# Patient Record
Sex: FEMALE | Race: WHITE | NOT HISPANIC OR LATINO | URBAN - METROPOLITAN AREA
[De-identification: names, ages, dates, MRNs, and addresses within clinical notes are randomized per-mention and may not be internally consistent; named-entity substitution may affect disease eponyms.]

---

## 2017-04-04 ENCOUNTER — OFFICE VISIT (OUTPATIENT)
Dept: INTERNAL MEDICINE | Facility: CLINIC | Age: 27
End: 2017-04-04
Payer: COMMERCIAL

## 2017-04-04 VITALS
SYSTOLIC BLOOD PRESSURE: 118 MMHG | HEART RATE: 106 BPM | TEMPERATURE: 97.5 F | WEIGHT: 141.5 LBS | HEIGHT: 66 IN | OXYGEN SATURATION: 99 % | BODY MASS INDEX: 22.74 KG/M2 | DIASTOLIC BLOOD PRESSURE: 78 MMHG

## 2017-04-04 DIAGNOSIS — M54.5 ACUTE RIGHT-SIDED LOW BACK PAIN, WITH SCIATICA PRESENCE UNSPECIFIED: ICD-10-CM

## 2017-04-04 DIAGNOSIS — R30.0 DYSURIA: ICD-10-CM

## 2017-04-04 DIAGNOSIS — N39.0 UPPER URINARY TRACT INFECTION: Primary | ICD-10-CM

## 2017-04-04 LAB
ALBUMIN SERPL-MCNC: 4 G/DL (ref 3.4–5)
ALBUMIN UR-MCNC: NEGATIVE MG/DL
ALP SERPL-CCNC: 84 U/L (ref 40–150)
ALT SERPL W P-5'-P-CCNC: 19 U/L (ref 0–50)
APPEARANCE UR: CLEAR
AST SERPL W P-5'-P-CCNC: 13 U/L (ref 0–45)
BACTERIA #/AREA URNS HPF: ABNORMAL /HPF
BETA HCG QUAL IFA URINE: NEGATIVE
BILIRUB DIRECT SERPL-MCNC: 0.1 MG/DL (ref 0–0.2)
BILIRUB SERPL-MCNC: 0.3 MG/DL (ref 0.2–1.3)
BILIRUB UR QL STRIP: NEGATIVE
COLOR UR AUTO: YELLOW
ERYTHROCYTE [DISTWIDTH] IN BLOOD BY AUTOMATED COUNT: 12.8 % (ref 10–15)
GLUCOSE UR STRIP-MCNC: NEGATIVE MG/DL
HCT VFR BLD AUTO: 43.8 % (ref 35–47)
HGB BLD-MCNC: 14.4 G/DL (ref 11.7–15.7)
HGB UR QL STRIP: ABNORMAL
KETONES UR STRIP-MCNC: NEGATIVE MG/DL
LEUKOCYTE ESTERASE UR QL STRIP: ABNORMAL
MCH RBC QN AUTO: 30.9 PG (ref 26.5–33)
MCHC RBC AUTO-ENTMCNC: 32.9 G/DL (ref 31.5–36.5)
MCV RBC AUTO: 94 FL (ref 78–100)
NITRATE UR QL: NEGATIVE
PH UR STRIP: 6 PH (ref 5–7)
PLATELET # BLD AUTO: 286 10E9/L (ref 150–450)
PROT SERPL-MCNC: 8.7 G/DL (ref 6.8–8.8)
RBC # BLD AUTO: 4.66 10E12/L (ref 3.8–5.2)
RBC #/AREA URNS AUTO: ABNORMAL /HPF (ref 0–2)
SP GR UR STRIP: <=1.005 (ref 1–1.03)
URN SPEC COLLECT METH UR: ABNORMAL
UROBILINOGEN UR STRIP-ACNC: 0.2 EU/DL (ref 0.2–1)
WBC # BLD AUTO: 10.2 10E9/L (ref 4–11)
WBC #/AREA URNS AUTO: ABNORMAL /HPF (ref 0–2)

## 2017-04-04 PROCEDURE — 87088 URINE BACTERIA CULTURE: CPT | Performed by: INTERNAL MEDICINE

## 2017-04-04 PROCEDURE — 87186 SC STD MICRODIL/AGAR DIL: CPT | Performed by: INTERNAL MEDICINE

## 2017-04-04 PROCEDURE — 81001 URINALYSIS AUTO W/SCOPE: CPT | Performed by: INTERNAL MEDICINE

## 2017-04-04 PROCEDURE — 36415 COLL VENOUS BLD VENIPUNCTURE: CPT | Performed by: INTERNAL MEDICINE

## 2017-04-04 PROCEDURE — 87086 URINE CULTURE/COLONY COUNT: CPT | Performed by: INTERNAL MEDICINE

## 2017-04-04 PROCEDURE — 99214 OFFICE O/P EST MOD 30 MIN: CPT | Performed by: INTERNAL MEDICINE

## 2017-04-04 PROCEDURE — 84703 CHORIONIC GONADOTROPIN ASSAY: CPT | Performed by: INTERNAL MEDICINE

## 2017-04-04 PROCEDURE — 85027 COMPLETE CBC AUTOMATED: CPT | Performed by: INTERNAL MEDICINE

## 2017-04-04 PROCEDURE — 80076 HEPATIC FUNCTION PANEL: CPT | Performed by: INTERNAL MEDICINE

## 2017-04-04 RX ORDER — CIPROFLOXACIN 500 MG/1
500 TABLET, FILM COATED ORAL 2 TIMES DAILY
Qty: 14 TABLET | Refills: 0 | Status: SHIPPED | OUTPATIENT
Start: 2017-04-04

## 2017-04-04 NOTE — NURSING NOTE
"Chief Complaint   Patient presents with     UTI       Initial /78  Pulse 106  Temp 97.5  F (36.4  C) (Oral)  Ht 5' 5.5\" (1.664 m)  Wt 141 lb 8 oz (64.2 kg)  LMP 03/30/2017  SpO2 99%  Breastfeeding? No  BMI 23.19 kg/m2 Estimated body mass index is 23.19 kg/(m^2) as calculated from the following:    Height as of this encounter: 5' 5.5\" (1.664 m).    Weight as of this encounter: 141 lb 8 oz (64.2 kg).  Medication Reconciliation: complete    "

## 2017-04-04 NOTE — PROGRESS NOTES
"    SUBJECTIVE:                                                    Katerine Kendrick is a 27 year old female who presents to clinic today for the following health issues:    URINARY TRACT SYMPTOMS     Onset: 4 days    Description:   Painful urination (Dysuria): no   Blood in urine (Hematuria): no   Delay in urine (Hesitency): no     Intensity: mild    Progression of Symptoms:  same    Accompanying Signs & Symptoms:  Fever/chills: YES  Flank pain YES  Nausea and vomiting: YES  Any vaginal symptoms: none  Abdominal/Pelvic Pain: YES   History:   History of frequent UTI's: no   History of kidney stones: no   Sexually Active: YES  Possibility of pregnancy: No    Precipitating factors:   none         Therapies Tried and outcome: AZO 2 weeks ago    Self rx for dysuria. Took OTC Azo for this.    LBP after this along with some nightly chills, nausea but no nazanin fevers.   Denies risk for STDs  Denies missing any of her BCPs  Denies any abd pain, diarrhea, constipation    Problem list and histories reviewed & adjusted, as indicated.  Additional history: as documented    Labs reviewed in EPIC    Reviewed and updated as needed this visit by clinical staff  Allergies       Reviewed and updated as needed this visit by Provider         ROS:  Constitutional, HEENT, cardiovascular, pulmonary, gi and gu systems are negative, except as otherwise noted.    OBJECTIVE:                                                    /78  Pulse 106  Temp 97.5  F (36.4  C) (Oral)  Ht 5' 5.5\" (1.664 m)  Wt 141 lb 8 oz (64.2 kg)  LMP 03/30/2017  SpO2 99%  Breastfeeding? No  BMI 23.19 kg/m2  Body mass index is 23.19 kg/(m^2).  GENERAL APPEARANCE: alert, no distress, fatigued and warm sweaty  HENT: nose and mouth without ulcers or lesions  NECK: no adenopathy, no asymmetry, masses, or scars and thyroid normal to palpation  RESP: lungs clear to auscultation - no rales, rhonchi or wheezes  CV: regular rates and rhythm, normal S1 S2, no S3 or S4 " and no murmur, click or rub  Abd: NTND  Back: R CVA tenderness   MS: extremities normal- no gross deformities noted  SKIN: no suspicious lesions or rashes    Diagnostic test results:  Results for orders placed or performed in visit on 04/04/17 (from the past 24 hour(s))   UA with Microscopic reflex to Culture   Result Value Ref Range    Color Urine Yellow     Appearance Urine Clear     Glucose Urine Negative NEG mg/dL    Bilirubin Urine Negative NEG    Ketones Urine Negative NEG mg/dL    Specific Gravity Urine <=1.005 1.003 - 1.035    pH Urine 6.0 5.0 - 7.0 pH    Protein Albumin Urine Negative NEG mg/dL    Urobilinogen Urine 0.2 0.2 - 1.0 EU/dL    Nitrite Urine Negative NEG    Blood Urine Trace (A) NEG    Leukocyte Esterase Urine Trace (A) NEG    Source Midstream Urine     WBC Urine 2-5 (A) 0 - 2 /HPF    RBC Urine O - 2 0 - 2 /HPF    Bacteria Urine Few (A) NEG /HPF   Beta HCG qual IFA urine   Result Value Ref Range    Beta HCG Qual IFA Urine Negative NEG   CBC with platelets   Result Value Ref Range    WBC 10.2 4.0 - 11.0 10e9/L    RBC Count 4.66 3.8 - 5.2 10e12/L    Hemoglobin 14.4 11.7 - 15.7 g/dL    Hematocrit 43.8 35.0 - 47.0 %    MCV 94 78 - 100 fl    MCH 30.9 26.5 - 33.0 pg    MCHC 32.9 31.5 - 36.5 g/dL    RDW 12.8 10.0 - 15.0 %    Platelet Count 286 150 - 450 10e9/L        ASSESSMENT/PLAN:                                                    1. Dysuria  2. Acute right-sided low back pain, with sciatica presence unspecified  3. Upper urinary tract infection  Her sx are not classic for pyelo - but with the recent classic lower tract sx and no rx- there is risk. No evidence of anything else on exam or labs. Will get urine cx. If no growth after 48 hrs then d/c antibx.   - ciprofloxacin (CIPRO) 500 MG tablet; Take 1 tablet (500 mg) by mouth 2 times daily  Dispense: 14 tablet; Refill: 0    Joseph Singleton MD  [facility]

## 2017-04-04 NOTE — MR AVS SNAPSHOT
"              After Visit Summary   2017    Katerine Kendrick    MRN: [id number]           Patient Information     Date Of Birth          1990        Visit Information        Provider Department      2017 11:00 AM Joseph Singleton MD [facility]        Today's Diagnoses     Upper urinary tract infection    -  1    Dysuria        Acute right-sided low back pain, with sciatica presence unspecified           Follow-ups after your visit        Who to contact     If you have questions or need follow up information about today's clinic visit or your schedule please contact [facility] directly at [phone].  Normal or non-critical lab and imaging results will be communicated to you by MyChart, letter or phone within 4 business days after the clinic has received the results. If you do not hear from us within 7 days, please contact the clinic through MyChart or phone. If you have a critical or abnormal lab result, we will notify you by phone as soon as possible.  Submit refill requests through [employer] or call your pharmacy and they will forward the refill request to us. Please allow 3 business days for your refill to be completed.          Additional Information About Your Visit        MyChart Information     [employer] lets you send messages to your doctor, view your test results, renew your prescriptions, schedule appointments and more. To sign up, go to [email or web address] . Click on \"Log in\" on the left side of the screen, which will take you to the Welcome page. Then click on \"Sign up Now\" on the right side of the page.     You will be asked to enter the access code listed below, as well as some personal information. Please follow the directions to create your username and password.     Your access code is: [email or web address]  Expires: 7/3/2017 12:25 PM     Your access code will  in 90 days. If you need help or a new code, please call your [place] " "clinic or [phone].        Care EveryWhere ID     This is your Care EveryWhere ID. This could be used by other organizations to access your [facility] medical records  [id number]        Your Vitals Were     Pulse Temperature Height Last Period Pulse Oximetry Breastfeeding?    106 97.5  F (36.4  C) (Oral) 5' 5.5\" (1.664 m) 03/30/2017 99% No    BMI (Body Mass Index)                   23.19 kg/m2            Blood Pressure from Last 3 Encounters:   04/04/17 118/78   12/16/16 115/68   11/25/15 114/81    Weight from Last 3 Encounters:   04/04/17 141 lb 8 oz (64.2 kg)   12/16/16 139 lb (63 kg)   11/25/15 138 lb (62.6 kg)              We Performed the Following     Beta HCG qual IFA urine     CBC with platelets     Hepatic panel (Albumin, ALT, AST, Bili, Alk Phos, TP)     UA with Microscopic reflex to Culture          Today's Medication Changes          These changes are accurate as of: 4/4/17 12:25 PM.  If you have any questions, ask your nurse or doctor.               Start taking these medicines.        Dose/Directions    ciprofloxacin 500 MG tablet   Commonly known as:  CIPRO   Used for:  Upper urinary tract infection   Started by:  Joseph Singleton MD        Dose:  500 mg   Take 1 tablet (500 mg) by mouth 2 times daily   Quantity:  14 tablet   Refills:  0            Where to get your medicines      These medications were sent to [place] [address] [place] [address]   [address] [facility] [address]     Phone:  [phone]     ciprofloxacin 500 MG tablet                Primary Care Provider Office Phone # Fax #    Dai GARCIA Gongora [phone]       [facility] [address] [address]        Thank you!     Thank you for choosing [facility]  for your care. Our goal is always to provide you with excellent care. Hearing back from our patients is one way we can continue to improve our services. Please take a few " minutes to complete the written survey that you may receive in the mail after your visit with us. Thank you!             Your Updated Medication List - Protect others around you: Learn how to safely use, store and throw away your medicines at www.disposemymeds.org.          This list is accurate as of: 4/4/17 12:25 PM.  Always use your most recent med list.                   Brand Name Dispense Instructions for use    ciprofloxacin 500 MG tablet    CIPRO    14 tablet    Take 1 tablet (500 mg) by mouth 2 times daily       ibuprofen 200 MG capsule      two tabs prn for pain and fever       levonorgestrel-ethinyl estradiol 0.1-20 MG-MCG per tablet    AVIANE,ALESSE,LESSINA    84 tablet    Take 1 tablet by mouth daily

## 2017-04-06 LAB
BACTERIA SPEC CULT: ABNORMAL
MICRO REPORT STATUS: ABNORMAL
MICROORGANISM SPEC CULT: ABNORMAL
SPECIMEN SOURCE: ABNORMAL

## 2017-11-08 ENCOUNTER — TELEPHONE (OUTPATIENT)
Dept: FAMILY MEDICINE | Facility: CLINIC | Age: 27
End: 2017-11-08

## 2017-11-08 NOTE — TELEPHONE ENCOUNTER
Panel Management Review          Composite cancer screening  Chart review shows that this patient is due/due soon for the following Pap Smear  Summary:    Patient is due/failing the following:   PAP    Action needed:   Patient needs office visit for physical and pap.    Type of outreach:    Sent letter.    Questions for provider review:    None                                                                                                                                    LAWRENCE LINDSAY        .

## 2017-11-08 NOTE — LETTER
[facility]  [address] [address]  [phone]    November 8, 2017    Katerine Kendrick  [address] [address] [address]          Dear Katerine,    --Pap smear screening is recommended every 3 years. Some patients require more frequent Pap smears based on an individual assessment of other risk factors. Please call the clinic to schedule this in the near future.     If you have had this test  at an outside facility, please let us know so that we can update your record.     Please disregard this notice if you have already scheduled an appointment.     Sincerely,    Dai LOPEZ

## 2017-12-23 ENCOUNTER — HEALTH MAINTENANCE LETTER (OUTPATIENT)
Age: 27
End: 2017-12-23

## 2018-03-04 DIAGNOSIS — Z30.41 ENCOUNTER FOR SURVEILLANCE OF CONTRACEPTIVE PILLS: ICD-10-CM

## 2018-03-05 RX ORDER — LEVONORGESTREL/ETHIN.ESTRADIOL 0.1-0.02MG
1 TABLET ORAL DAILY
Qty: 30 TABLET | Refills: 0 | Status: SHIPPED | OUTPATIENT
Start: 2018-03-05

## 2018-03-05 NOTE — TELEPHONE ENCOUNTER
"Requested Prescriptions   Pending Prescriptions Disp Refills     ORSYTHIA 0.1-20 MG-MCG per tablet [Pharmacy Med Name: ORSYTHIA-28 TABLET]  Last Written Prescription Date:  12/16/2016  Last Fill Quantity: 84,  # refills: 4   Last office visit: 12/16/2016 with prescribing provider:  Jaret   Future Office Visit:     84 tablet 3     Sig: TAKE 1 TABLET BY MOUTH DAILY    Contraceptives Protocol Failed    3/4/2018  1:12 AM       Failed - Recent (12 mo) or future (30 days) visit within the authorizing provider's specialty    Patient had office visit in the last year or has a visit in the next 30 days with authorizing provider.  See \"Patient Info\" tab in inbasket, or \"Choose Columns\" in Meds & Orders section of the refill encounter.            Passed - Patient is not a current smoker if age is 35 or older       Passed - No active pregnancy on record       Passed - No positive pregnancy test in past 12 months        Vishal BLACKMAN (R)    "

## 2019-05-20 ENCOUNTER — RECORDS - HEALTHEAST (OUTPATIENT)
Dept: ADMINISTRATIVE | Facility: OTHER | Age: 29
End: 2019-05-20

## 2019-07-18 ENCOUNTER — COMMUNICATION - HEALTHEAST (OUTPATIENT)
Dept: PEDIATRICS | Facility: CLINIC | Age: 29
End: 2019-07-18

## 2019-07-23 ENCOUNTER — AMBULATORY - HEALTHEAST (OUTPATIENT)
Dept: MATERNAL FETAL MEDICINE | Facility: HOSPITAL | Age: 29
End: 2019-07-23

## 2019-07-23 ENCOUNTER — PRE VISIT (OUTPATIENT)
Dept: MATERNAL FETAL MEDICINE | Facility: CLINIC | Age: 29
End: 2019-07-23

## 2019-07-23 ENCOUNTER — COMMUNICATION - HEALTHEAST (OUTPATIENT)
Dept: MATERNAL FETAL MEDICINE | Facility: HOSPITAL | Age: 29
End: 2019-07-23

## 2019-07-23 ENCOUNTER — TRANSCRIBE ORDERS (OUTPATIENT)
Dept: MATERNAL FETAL MEDICINE | Facility: CLINIC | Age: 29
End: 2019-07-23

## 2019-07-23 DIAGNOSIS — O26.90 PREGNANCY RELATED CONDITION, ANTEPARTUM: Primary | ICD-10-CM

## 2019-07-23 DIAGNOSIS — O26.90 PREGNANCY, ANTEPARTUM, COMPLICATIONS: ICD-10-CM

## 2019-07-24 ENCOUNTER — HOSPITAL ENCOUNTER (OUTPATIENT)
Dept: ULTRASOUND IMAGING | Facility: CLINIC | Age: 29
Discharge: HOME | End: 2019-07-24
Attending: OBSTETRICS & GYNECOLOGY | Admitting: OBSTETRICS & GYNECOLOGY
Payer: COMMERCIAL

## 2019-07-24 ENCOUNTER — OFFICE VISIT (OUTPATIENT)
Dept: MATERNAL FETAL MEDICINE | Facility: CLINIC | Age: 29
End: 2019-07-24
Attending: OBSTETRICS & GYNECOLOGY
Payer: COMMERCIAL

## 2019-07-24 DIAGNOSIS — O35.9XX0 FETAL ABNORMALITY AFFECTING MANAGEMENT OF MOTHER, SINGLE OR UNSPECIFIED FETUS: Primary | ICD-10-CM

## 2019-07-24 DIAGNOSIS — O26.90 PREGNANCY RELATED CONDITION, ANTEPARTUM: ICD-10-CM

## 2019-07-24 PROCEDURE — 76811 OB US DETAILED SNGL FETUS: CPT

## 2019-07-24 NOTE — PROGRESS NOTES
Please see full imaging report from [employer] program under imaging tab.    Claudia Lester MD  Maternal Fetal Medicine

## 2019-08-18 ENCOUNTER — ANESTHESIA - HEALTHEAST (OUTPATIENT)
Dept: OBGYN | Facility: CLINIC | Age: 29
End: 2019-08-18

## 2019-08-26 ENCOUNTER — AMBULATORY - HEALTHEAST (OUTPATIENT)
Dept: PEDIATRICS | Facility: CLINIC | Age: 29
End: 2019-08-26

## 2019-11-03 ENCOUNTER — HEALTH MAINTENANCE LETTER (OUTPATIENT)
Age: 29
End: 2019-11-03

## 2020-02-10 ENCOUNTER — HEALTH MAINTENANCE LETTER (OUTPATIENT)
Age: 30
End: 2020-02-10

## 2020-09-17 ENCOUNTER — COMMUNICATION - HEALTHEAST (OUTPATIENT)
Dept: HEALTH INFORMATION MANAGEMENT | Facility: CLINIC | Age: 30
End: 2020-09-17

## 2020-11-16 ENCOUNTER — HEALTH MAINTENANCE LETTER (OUTPATIENT)
Age: 30
End: 2020-11-16

## 2021-03-11 ENCOUNTER — RECORDS - HEALTHEAST (OUTPATIENT)
Dept: ADMINISTRATIVE | Facility: OTHER | Age: 31
End: 2021-03-11

## 2021-05-30 NOTE — TELEPHONE ENCOUNTER
New Appointment Needed  What is the reason for the visit:    Patient is scheduled to have her baby 8/10/19 at [facility]. Is calling to see if Dr Gomez would accept and be baby's PCP.  Provider Preference: Dr Gomez  How soon do you need to be seen?: Please call back to this patient to discuss. - [phone]  Waitlist offered?: No  Okay to leave a detailed message:  Yes

## 2021-05-30 NOTE — TELEPHONE ENCOUNTER
Yes!  I would be happy to see her baby. When she delivers at [facility], she can give the nurses my name as baby's PCP, and then someone from our group will be there to see baby each day that you are in the hospital, and parents can schedule baby's clinic appointment with me after discharge.  I look forward to meeting them!

## 2021-05-31 NOTE — ANESTHESIA PROCEDURE NOTES
Epidural Block    Patient location during procedure: OB  Time Called: 8/18/2019 4:12 PM  Reason for Block:labor epidural  Staffing:  Performing  Anesthesiologist: Joseph Vaca MD  Preanesthetic Checklist  Completed: patient identified, risks, benefits, and alternatives discussed, timeout performed, consent obtained, at patient's request, airway assessed, oxygen available, suction available, emergency drugs available and hand hygiene performed  Procedure  Patient position: sitting  Prep: ChloraPrep and site prepped and draped  Patient monitoring: blood pressure, heart rate and continuous pulse oximetry  Approach: midline  Location: L2-L3  Injection technique: DAVIE saline  Number of Attempts:1  Needle  Needle type: 3DVista   Needle gauge: 18 G     Catheter in Space: 5  Assessment  Sensory level: T10  No complications      Additional Notes:  3cc 1% lidocaine skin, negative aspiration, negative test dose of 4cc 1.5% PF lidocaine with epi 1/200

## 2021-05-31 NOTE — ANESTHESIA POSTPROCEDURE EVALUATION
Patient: Katerine Vernon  * No procedures listed *  Anesthesia type: epidural    Patient location: PACU  Last vitals: No vitals data found for the desired time range.    Post vital signs: stable  Level of consciousness: awake and responds to simple questions  Post-anesthesia pain: pain controlled  Post-anesthesia nausea and vomiting: no  Pulmonary: unassisted, return to baseline  Cardiovascular: stable and blood pressure at baseline  Hydration: adequate  Anesthetic events: no    QCDR Measures:  ASA# 11 - Kelley-op Cardiac Arrest: ASA11B - Patient did NOT experience unanticipated cardiac arrest  ASA# 12 - Kelley-op Mortality Rate: ASA12B - Patient did NOT die  ASA# 13 - PACU Re-Intubation Rate: NA - No ETT / LMA used for case  ASA# 10 - Composite Anes Safety: ASA10A - No serious adverse event    Additional Notes:  Patient doing well per nurse, sleeping at time of visit, did not want to wake.c  Will call with any questions or concerns.

## 2021-05-31 NOTE — ANESTHESIA PREPROCEDURE EVALUATION
Anesthesia Evaluation      Patient summary reviewed   No history of anesthetic complications     Airway   Mallampati: I  Neck ROM: full   Pulmonary     breath sounds clear to auscultation  (-) asthma, shortness of breath, recent URI, sleep apnea, not a smoker                         Cardiovascular   Exercise tolerance: > or = 4 METS  (-) hypertension, angina  Rhythm: regular  Rate: normal,         Neuro/Psych    (-) no neuromuscular disease, no CVA    Endo/Other    (+) pregnant  (-) no diabetes     GI/Hepatic/Renal            Dental - normal exam                        Anesthesia Plan  Planned anesthetic: epidural    ASA 2     Anesthetic plan and risks discussed with: patient    Post-op plan: routine recovery

## 2021-06-07 ENCOUNTER — AMBULATORY - HEALTHEAST (OUTPATIENT)
Dept: LAB | Facility: CLINIC | Age: 31
End: 2021-06-07

## 2021-06-07 ENCOUNTER — AMBULATORY - HEALTHEAST (OUTPATIENT)
Dept: OBGYN | Facility: CLINIC | Age: 31
End: 2021-06-07

## 2021-06-07 DIAGNOSIS — Z33.1 PREGNANT STATE, INCIDENTAL: ICD-10-CM

## 2021-06-07 LAB
SARS-COV-2 PCR COMMENT: NORMAL
SARS-COV-2 RNA SPEC QL NAA+PROBE: NEGATIVE
SARS-COV-2 VIRUS SPECIMEN SOURCE: NORMAL

## 2021-06-08 ENCOUNTER — COMMUNICATION - HEALTHEAST (OUTPATIENT)
Dept: SCHEDULING | Facility: CLINIC | Age: 31
End: 2021-06-08

## 2021-06-09 ENCOUNTER — ANESTHESIA - HEALTHEAST (OUTPATIENT)
Dept: OBGYN | Facility: CLINIC | Age: 31
End: 2021-06-09

## 2021-06-10 ENCOUNTER — RECORDS - HEALTHEAST (OUTPATIENT)
Dept: ADMINISTRATIVE | Facility: OTHER | Age: 31
End: 2021-06-10

## 2021-06-20 NOTE — LETTER
Letter by Lynnette Gomez at      Author: Lynnette Gomez Service: -- Author Type: --    Filed:  Encounter Date: 2020 Status: (Other)         2020       Katerine MCINTYRE Jaymiecamilo  [address]  [place] [address]    Dear Katerine Vernon:    We are pleased to provide you with secure, online access to medical information for you and your family. Per your request, we have expanded your account to allow access to the records of the following family members:              Trina VIZCAINO Janeen (privilege ends on 2031.)     How Do I Login?  1. In your Internet browser, go to [email or web address].  2. Click on Sign Up Now   3. Enter your [employer] Activation Code exactly as it appears below. This code will  60 days after it is generated. You will not need to use this code after you have completed the sign-up process. If you do not sign up before the expiration date, you must request a new code.     [employer] Activation Code: [email or web address]  Expires: 2020 10:56 AM    4. Enter in your date of birth and zip code.  5. Create a [employer] username. Think of one that is secure and easy to remember.  Your username must be between 6 and 20 characters.  6. Create a [employer] password. You can change your password at any time. Your password must be between 8 and 45 characters, contain at least two letters and one number, and contain both upper and lower case letters.  7. Choose a security question, enter your answer, and click Next. This can be used to access [employer] if you forget your password.   8. Enter a valid e-mail address to receive e-mail notifications when new information is available in [employer].  9. Click Sign In.        How Do I Access a Family Member's Account?  10. Select the account you want to access by clicking the [place] with the appropriate patient's name at the top of your screen.   11. You will see a disclaimer page letting you know that you will be  viewing a family member's record. Review the disclaimer and then click Accept Proxy Access Disclaimer to proceed.  12. Once you switch to viewing a family member's record, you can navigate to [employer] pages the same way you would for yourself. You can return to your own account by clicking the [place] at the top of the screen with your name on it.    13. To customize colors and names of the linked accounts, you can select Personalize from the Profile dropdown menu at the top of the screen, then click the Edit button to make changes.      Additional Information  If you have questions, you can e-mail [email or web address] or call [phone] to talk to our [facility] [employer] staff. Remember, [employer] is NOT to be used for urgent needs. For medical emergencies, dial 911.

## 2021-06-26 NOTE — ANESTHESIA PROCEDURE NOTES
Epidural Block    Patient location during procedure: OB  Time Called: 6/9/2021 7:01 PM  Reason for Block:labor epidural  Staffing:  Performing  Anesthesiologist: Iain Morrison MD  Preanesthetic Checklist  Completed: patient identified, risks, benefits, and alternatives discussed, timeout performed, consent obtained, airway assessed, oxygen available, suction available, emergency drugs available and hand hygiene performed  Procedure  Patient position: sitting  Prep: ChloraPrep and site prepped and draped  Patient monitoring: continuous pulse oximetry, heart rate and blood pressure  Approach: midline  Location: L3-L4  Injection technique: DAVIE saline  Number of Attempts:1  Needle  Needle type: [employer] - is now [employer]   Needle gauge: 18 G     Catheter in Space: 6  Assessment  Sensory level:  No complications

## 2021-06-26 NOTE — ANESTHESIA POSTPROCEDURE EVALUATION
Patient: Katerine Vernon  * No procedures listed *  Anesthesia type: epidural    Patient location: Labor and Delivery  Last vitals: No vitals data found for the desired time range.    Post vital signs: stable  Level of consciousness: awake, alert and responds to simple questions  Post-anesthesia pain: pain controlled  Post-anesthesia nausea and vomiting: no  Pulmonary: unassisted, return to baseline  Cardiovascular: stable and blood pressure at baseline  Hydration: adequate  Anesthetic events: no    QCDR Measures:  ASA# 11 - Kelley-op Cardiac Arrest: ASA11B - Patient did NOT experience unanticipated cardiac arrest  ASA# 12 - Kelley-op Mortality Rate: ASA12B - Patient did NOT die  ASA# 13 - PACU Re-Intubation Rate: ASA13B - Patient did NOT require a new airway mgmt  ASA# 10 - Composite Anes Safety: ASA10A - No serious adverse event    Additional Notes:

## 2021-07-14 PROBLEM — Z34.90 PREGNANT: Status: RESOLVED | Noted: 2019-08-18 | Resolved: 2019-08-18

## 2021-07-14 PROBLEM — Z34.90 PREGNANT: Status: RESOLVED | Noted: 2021-06-09 | Resolved: 2021-06-10

## 2021-08-24 ENCOUNTER — MEDICAL CORRESPONDENCE (OUTPATIENT)
Dept: HEALTH INFORMATION MANAGEMENT | Facility: CLINIC | Age: 31
End: 2021-08-24

## 2021-09-12 ENCOUNTER — HEALTH MAINTENANCE LETTER (OUTPATIENT)
Age: 31
End: 2021-09-12

## 2021-10-12 ENCOUNTER — MEDICAL CORRESPONDENCE (OUTPATIENT)
Dept: HEALTH INFORMATION MANAGEMENT | Facility: CLINIC | Age: 31
End: 2021-10-12

## 2021-12-14 ENCOUNTER — MEDICAL CORRESPONDENCE (OUTPATIENT)
Dept: HEALTH INFORMATION MANAGEMENT | Facility: CLINIC | Age: 31
End: 2021-12-14
Payer: COMMERCIAL

## 2022-01-02 ENCOUNTER — HEALTH MAINTENANCE LETTER (OUTPATIENT)
Age: 32
End: 2022-01-02

## 2022-11-19 ENCOUNTER — HEALTH MAINTENANCE LETTER (OUTPATIENT)
Age: 32
End: 2022-11-19

## 2023-04-09 ENCOUNTER — HEALTH MAINTENANCE LETTER (OUTPATIENT)
Age: 33
End: 2023-04-09

## 2024-06-16 ENCOUNTER — HEALTH MAINTENANCE LETTER (OUTPATIENT)
Age: 34
End: 2024-06-16